# Patient Record
Sex: MALE | Race: WHITE | Employment: STUDENT | URBAN - METROPOLITAN AREA
[De-identification: names, ages, dates, MRNs, and addresses within clinical notes are randomized per-mention and may not be internally consistent; named-entity substitution may affect disease eponyms.]

---
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
## 2024-12-27 ENCOUNTER — HOSPITAL ENCOUNTER (EMERGENCY)
Facility: HOSPITAL | Age: 24
Discharge: HOME | End: 2024-12-28
Attending: EMERGENCY MEDICINE
Payer: COMMERCIAL

## 2024-12-27 DIAGNOSIS — R11.2 NAUSEA AND VOMITING, UNSPECIFIED VOMITING TYPE: Primary | ICD-10-CM

## 2024-12-27 LAB
ALBUMIN SERPL-MCNC: 4.7 G/DL (ref 3.5–5.2)
ALBUMIN/GLOB SERPL: 1.8 G/DL
ALP SERPL-CCNC: 52 U/L (ref 39–117)
ALT SERPL W P-5'-P-CCNC: 40 U/L (ref 1–41)
ANION GAP SERPL CALCULATED.3IONS-SCNC: 14.7 MMOL/L (ref 5–15)
AST SERPL-CCNC: 32 U/L (ref 1–40)
BASOPHILS # BLD AUTO: 0.01 10*3/MM3 (ref 0–0.2)
BASOPHILS NFR BLD AUTO: 0.1 % (ref 0–1.5)
BILIRUB SERPL-MCNC: 2.5 MG/DL (ref 0–1.2)
BUN SERPL-MCNC: 18 MG/DL (ref 6–20)
BUN/CREAT SERPL: 15.5 (ref 7–25)
CALCIUM SPEC-SCNC: 9.4 MG/DL (ref 8.6–10.5)
CHLORIDE SERPL-SCNC: 98 MMOL/L (ref 98–107)
CO2 SERPL-SCNC: 27.3 MMOL/L (ref 22–29)
CREAT SERPL-MCNC: 1.16 MG/DL (ref 0.76–1.27)
DEPRECATED RDW RBC AUTO: 34.8 FL (ref 37–54)
EGFRCR SERPLBLD CKD-EPI 2021: 90.8 ML/MIN/1.73
EOSINOPHIL # BLD AUTO: 0.09 10*3/MM3 (ref 0–0.4)
EOSINOPHIL NFR BLD AUTO: 0.8 % (ref 0.3–6.2)
ERYTHROCYTE [DISTWIDTH] IN BLOOD BY AUTOMATED COUNT: 11.9 % (ref 12.3–15.4)
GLOBULIN UR ELPH-MCNC: 2.6 GM/DL
GLUCOSE SERPL-MCNC: 101 MG/DL (ref 65–99)
HCT VFR BLD AUTO: 45.5 % (ref 37.5–51)
HGB BLD-MCNC: 15.8 G/DL (ref 13–17.7)
HOLD SPECIMEN: NORMAL
IMM GRANULOCYTES # BLD AUTO: 0.01 10*3/MM3 (ref 0–0.05)
IMM GRANULOCYTES NFR BLD AUTO: 0.1 % (ref 0–0.5)
LIPASE SERPL-CCNC: 18 U/L (ref 13–60)
LYMPHOCYTES # BLD AUTO: 0.57 10*3/MM3 (ref 0.7–3.1)
LYMPHOCYTES NFR BLD AUTO: 5.4 % (ref 19.6–45.3)
MCH RBC QN AUTO: 27.7 PG (ref 26.6–33)
MCHC RBC AUTO-ENTMCNC: 34.7 G/DL (ref 31.5–35.7)
MCV RBC AUTO: 79.7 FL (ref 79–97)
MONOCYTES # BLD AUTO: 0.48 10*3/MM3 (ref 0.1–0.9)
MONOCYTES NFR BLD AUTO: 4.5 % (ref 5–12)
NEUTROPHILS NFR BLD AUTO: 89.1 % (ref 42.7–76)
NEUTROPHILS NFR BLD AUTO: 9.43 10*3/MM3 (ref 1.7–7)
PLATELET # BLD AUTO: 220 10*3/MM3 (ref 140–450)
PMV BLD AUTO: 10.7 FL (ref 6–12)
POTASSIUM SERPL-SCNC: 3.7 MMOL/L (ref 3.5–5.2)
PROT SERPL-MCNC: 7.3 G/DL (ref 6–8.5)
RBC # BLD AUTO: 5.71 10*6/MM3 (ref 4.14–5.8)
SODIUM SERPL-SCNC: 140 MMOL/L (ref 136–145)
WBC NRBC COR # BLD AUTO: 10.59 10*3/MM3 (ref 3.4–10.8)
WHOLE BLOOD HOLD COAG: NORMAL
WHOLE BLOOD HOLD SPECIMEN: NORMAL

## 2024-12-27 PROCEDURE — 25010000002 ONDANSETRON PER 1 MG: Performed by: EMERGENCY MEDICINE

## 2024-12-27 PROCEDURE — 96374 THER/PROPH/DIAG INJ IV PUSH: CPT

## 2024-12-27 PROCEDURE — 25810000003 SODIUM CHLORIDE 0.9 % SOLUTION: Performed by: EMERGENCY MEDICINE

## 2024-12-27 PROCEDURE — 85025 COMPLETE CBC W/AUTO DIFF WBC: CPT | Performed by: EMERGENCY MEDICINE

## 2024-12-27 PROCEDURE — 80053 COMPREHEN METABOLIC PANEL: CPT | Performed by: EMERGENCY MEDICINE

## 2024-12-27 PROCEDURE — 99283 EMERGENCY DEPT VISIT LOW MDM: CPT

## 2024-12-27 PROCEDURE — 83690 ASSAY OF LIPASE: CPT | Performed by: EMERGENCY MEDICINE

## 2024-12-27 RX ORDER — SODIUM CHLORIDE 0.9 % (FLUSH) 0.9 %
10 SYRINGE (ML) INJECTION AS NEEDED
Status: DISCONTINUED | OUTPATIENT
Start: 2024-12-27 | End: 2024-12-28 | Stop reason: HOSPADM

## 2024-12-27 RX ORDER — ONDANSETRON 4 MG/1
4 TABLET, ORALLY DISINTEGRATING ORAL EVERY 6 HOURS PRN
Qty: 8 TABLET | Refills: 0 | OUTPATIENT
Start: 2024-12-27 | End: 2024-12-28

## 2024-12-27 RX ORDER — ONDANSETRON 2 MG/ML
4 INJECTION INTRAMUSCULAR; INTRAVENOUS ONCE
Status: COMPLETED | OUTPATIENT
Start: 2024-12-27 | End: 2024-12-27

## 2024-12-27 RX ADMIN — ONDANSETRON 4 MG: 2 INJECTION INTRAMUSCULAR; INTRAVENOUS at 23:25

## 2024-12-27 RX ADMIN — SODIUM CHLORIDE 500 ML: 9 INJECTION, SOLUTION INTRAVENOUS at 23:26

## 2024-12-27 NOTE — Clinical Note
[facility] EMERGENCY DEPARTMENT [place]  [address]   [place] [address]  Phone: [phone]  Fax: [phone]    Stevie Stevenson was seen and treated in our emergency department on 12/27/2024.  He may return to work on 12/29/2024.         Thank you for choosing [facility].    Darrick Arciniega MD

## 2024-12-27 NOTE — Clinical Note
[facility] EMERGENCY DEPARTMENT [place]  [address]   [place] [address]  Phone: [phone]  Fax: [phone]    Stevie Stevenson was seen and treated in our emergency department on 12/27/2024.  He may return to work on 12/29/2024.         Thank you for choosing [facility].    Darrick Arciniega MD

## 2024-12-28 VITALS
BODY MASS INDEX: 27.09 KG/M2 | OXYGEN SATURATION: 95 % | HEART RATE: 96 BPM | SYSTOLIC BLOOD PRESSURE: 123 MMHG | HEIGHT: 72 IN | DIASTOLIC BLOOD PRESSURE: 67 MMHG | RESPIRATION RATE: 19 BRPM | WEIGHT: 200 LBS | TEMPERATURE: 98.3 F

## 2024-12-28 RX ORDER — ONDANSETRON 4 MG/1
4 TABLET, ORALLY DISINTEGRATING ORAL EVERY 6 HOURS PRN
Qty: 8 TABLET | Refills: 0 | OUTPATIENT
Start: 2024-12-28 | End: 2024-12-28

## 2024-12-28 RX ORDER — ONDANSETRON 4 MG/1
4 TABLET, ORALLY DISINTEGRATING ORAL EVERY 6 HOURS PRN
Qty: 8 TABLET | Refills: 0 | Status: SHIPPED | OUTPATIENT
Start: 2024-12-28

## 2024-12-28 NOTE — FSED PROVIDER NOTE
"Subjective  History of Present Illness:    The patient presents with vomiting.  Pt reports that this started 30 minutes after eating chicken tenders.  Pt denies diarrhea.  Pt reports diffuse abdominal pain that is alleviated after vomiting.  Pt denies fever.  Pt denies changes in uriination      Nurses Notes reviewed and agree, including vitals, allergies, social history and prior medical history.     REVIEW OF SYSTEMS:   Review of Systems   Gastrointestinal:  Positive for abdominal pain.       History reviewed. No pertinent past medical history.    Allergies:    Gluten meal and Keflex [cephalexin]      History reviewed. No pertinent surgical history.      Social History     Socioeconomic History    Marital status: Single         History reviewed. No pertinent family history.    Objective  Physical Exam:  /67   Pulse 96   Temp 98.3 °F (36.8 °C) (Oral)   Resp 19   Ht 182.9 cm (72\")   Wt 90.7 kg (200 lb)   SpO2 95%   BMI 27.12 kg/m²      Physical Exam  Vitals and nursing note reviewed.   Constitutional:       Appearance: He is well-developed. He is obese.   Eyes:      Extraocular Movements: Extraocular movements intact.      Pupils: Pupils are equal, round, and reactive to light.   Cardiovascular:      Rate and Rhythm: Normal rate and regular rhythm.      Heart sounds: Normal heart sounds.      No friction rub.   Pulmonary:      Effort: Pulmonary effort is normal. No respiratory distress.      Breath sounds: Normal breath sounds.   Abdominal:      Palpations: Abdomen is soft.   Skin:     General: Skin is warm and dry.   Neurological:      General: No focal deficit present.      Mental Status: He is alert.      Cranial Nerves: No cranial nerve deficit.   Psychiatric:         Mood and Affect: Mood normal.         Behavior: Behavior normal.         Procedures    ED Course:         Lab Results (last 24 hours)       Procedure Component Value Units Date/Time    CBC & Differential [[id number]]  (Abnormal) " Collected: 12/27/24 2326    Specimen: Blood Updated: 12/27/24 2335    Narrative:      The following orders were created for panel order CBC & Differential.  Procedure                               Abnormality         Status                     ---------                               -----------         ------                     CBC Auto Differential[[id number]]        Abnormal            Final result                 Please view results for these tests on the individual orders.    Comprehensive Metabolic Panel [[id number]]  (Abnormal) Collected: 12/27/24 2326    Specimen: Blood Updated: 12/27/24 2352     Glucose 101 mg/dL      BUN 18 mg/dL      Creatinine 1.16 mg/dL      Sodium 140 mmol/L      Potassium 3.7 mmol/L      Chloride 98 mmol/L      CO2 27.3 mmol/L      Calcium 9.4 mg/dL      Total Protein 7.3 g/dL      Albumin 4.7 g/dL      ALT (SGPT) 40 U/L      AST (SGOT) 32 U/L      Alkaline Phosphatase 52 U/L      Total Bilirubin 2.5 mg/dL      Globulin 2.6 gm/dL      A/G Ratio 1.8 g/dL      BUN/Creatinine Ratio 15.5     Anion Gap 14.7 mmol/L      eGFR 90.8 mL/min/1.73     Narrative:      GFR Categories in Chronic Kidney Disease (CKD)      GFR Category          GFR (mL/min/1.73)    Interpretation  G1                     90 or greater         Normal or high (1)  G2                      60-89                Mild decrease (1)  G3a                   45-59                Mild to moderate decrease  G3b                   30-44                Moderate to severe decrease  G4                    15-29                Severe decrease  G5                    14 or less           Kidney failure          (1)In the absence of evidence of kidney disease, neither GFR category G1 or G2 fulfill the criteria for CKD.    eGFR calculation 2021 CKD-EPI creatinine equation, which does not include race as a factor    Lipase [[id number]]  (Normal) Collected: 12/27/24 2326    Specimen: Blood Updated: 12/27/24 2352     Lipase 18 U/L     CBC Auto  Differential [[id number]]  (Abnormal) Collected: 12/27/24 2326    Specimen: Blood Updated: 12/27/24 2335     WBC 10.59 10*3/mm3      RBC 5.71 10*6/mm3      Hemoglobin 15.8 g/dL      Hematocrit 45.5 %      MCV 79.7 fL      MCH 27.7 pg      MCHC 34.7 g/dL      RDW 11.9 %      RDW-SD 34.8 fl      MPV 10.7 fL      Platelets 220 10*3/mm3      Neutrophil % 89.1 %      Lymphocyte % 5.4 %      Monocyte % 4.5 %      Eosinophil % 0.8 %      Basophil % 0.1 %      Immature Grans % 0.1 %      Neutrophils, Absolute 9.43 10*3/mm3      Lymphocytes, Absolute 0.57 10*3/mm3      Monocytes, Absolute 0.48 10*3/mm3      Eosinophils, Absolute 0.09 10*3/mm3      Basophils, Absolute 0.01 10*3/mm3      Immature Grans, Absolute 0.01 10*3/mm3              No radiology results from the last 24 hrs       MDM      Initial impression of presenting illness: Food poisoning    DDX: includes but is not limited to: Viral gastroenteritis, gastritis, pancreatitis    Patient arrives by private with vitals interpreted by myself.     Pertinent features from physical exam: No significant abdominal tenderness.    Initial diagnostic plan: Labs    Results from initial plan were reviewed and interpreted by me revealing nonactionable      Interventions / Re-evaluation: Patient reports pain is resolved after medication was given    Medications   ondansetron (ZOFRAN) injection 4 mg (4 mg Intravenous Given 12/27/24 2325)   sodium chloride 0.9 % bolus 500 mL (0 mL Intravenous Stopped 12/28/24 0037)       Results/clinical rationale were discussed with patient, patient's mother, and patient's father    Data interpreted: Nursing notes reviewed, vital signs reviewed.  CBC with differential, CMP, and lipase  No data reviewed reviewed independently interpreted by me.  EKG independently interpreted by me.  O2 saturation:    Care significantly affected by Social Determinants of Health (housing and economic circumstances, unemployment)               [] Yes     [x] No               If yes, Patient's care significantly limited by  Social Determinants of Health including:              [] Inadequate housing              [] Low income              [] Alcoholism and drug addiction in family              [] Problems related to primary support group              [] Unemployment              [] Problems related to employment              [] Other Social Determinants of Health:     Counseling: Discussed the results above with the patient regarding need for discharged home. Return precautions were given to patient, patient's mother, and patient's father.  Patient understands and agrees plan of care.      -----  ED Disposition       ED Disposition   Discharge    Condition   Stable    Comment   --             Final diagnoses:   Nausea and vomiting, unspecified vomiting type      Your Follow-Up Providers       Go to  [facility] EMERGENCY DEPARTMENT [place].    Specialty: Emergency Medicine  Follow up details: If symptoms worsen  [address]  [place] [facility] [address] [phone]             PATIENT CONNECTION - Mayer. Call in 1 week.    [facility] [address] [phone]                     Contact information for after-discharge care    Follow-up information has not been specified.                    Your medication list        START taking these medications        Instructions Last Dose Given Next Dose Due   ondansetron ODT 4 MG disintegrating tablet  Commonly known as: ZOFRAN-ODT      Take 1 tablet by mouth Every 6 (Six) Hours As Needed for Nausea or Vomiting for up to 8 doses.                 Where to Get Your Medications        You can get these medications from any pharmacy    Bring a paper prescription for each of these medications  ondansetron ODT 4 MG disintegrating tablet

## 2025-05-16 NOTE — PROGRESS NOTES
[facility] PRE-SURGICAL TESTING INSTRUCTIONS                      PRIOR TO PROCEDURE DATE:    1. PLEASE FOLLOW ANY INSTRUCTIONS GIVEN TO YOU PER YOUR SURGEON.      2. Arrange for someone to drive you home and be with you for the first 24 hours after discharge for your safety after your procedure for which you received sedation. Ensure it is someone we can share information with regarding your discharge.     NOTE: At this time ONLY 2 ADULTS may accompany you   One person ENCOURAGED to stay at hospital entire time if outpatient surgery      3. You must contact your surgeon for instructions IF:  You are taking any blood thinners, aspirin, anti-inflammatory or vitamins.  There is a change in your physical condition such as a cold, fever, rash, cuts, sores, or any other infection, especially near your surgical site.    4. Do not drink alcohol the day before or day of your procedure.  Do not use any recreational marijuana at least 24 hours or street drugs (heroin, cocaine) at minimum 5 days prior to your procedure.     5. A Pre-Surgical History and Physical MUST be completed WITHIN 30 DAYS OR LESS prior to your procedure.by your Physician or an Urgent Care        THE DAY OF YOUR PROCEDURE:  1.  Follow instructions for ARRIVAL TIME as DIRECTED BY YOUR SURGEON.     2. Enter the MAIN entrance from [facility] and follow the signs to the free Parking Garage or  Parking (offered free of charge 7 am-5pm).      3. Enter the Main Entrance of the hospital (do not enter from the lower level of the parking garage). Upon entrance, check in with the  at the surgical information desk on your LEFT.   Bring your insurance card and photo ID to register      4. DO NOT EAT ANYTHING 8 hours prior to arrival for surgery.  You may have up to 8 ounces of water 4 hours prior to your arrival for surgery.   NOTE: ALL Gastric, Bariatric & Bowel surgery patients - you MUST follow your surgeon's instructions regarding

## 2025-05-16 NOTE — PROGRESS NOTES
5/16 @ 8921 Pt confirms did not have nasal swab or any bloodwork with PreOp. TI Complete. MD    5/16 @ 1555 Left VM with Sallie @ Dr. Rodriguez office to clarify if OK to have BMP, CBC & MRSA swab DOS. MD

## 2025-05-21 ENCOUNTER — HOSPITAL ENCOUNTER (OUTPATIENT)
Age: 25
Setting detail: OUTPATIENT SURGERY
Discharge: HOME | End: 2025-05-21
Attending: ORTHOPAEDIC SURGERY | Admitting: ORTHOPAEDIC SURGERY
Payer: COMMERCIAL

## 2025-05-21 ENCOUNTER — ANESTHESIA (OUTPATIENT)
Dept: OPERATING ROOM | Age: 25
End: 2025-05-21
Payer: COMMERCIAL

## 2025-05-21 ENCOUNTER — ANESTHESIA EVENT (OUTPATIENT)
Dept: OPERATING ROOM | Age: 25
End: 2025-05-21
Payer: COMMERCIAL

## 2025-05-21 VITALS
OXYGEN SATURATION: 99 % | RESPIRATION RATE: 16 BRPM | BODY MASS INDEX: 26.38 KG/M2 | TEMPERATURE: 97.2 F | HEART RATE: 79 BPM | SYSTOLIC BLOOD PRESSURE: 102 MMHG | WEIGHT: 194.8 LBS | HEIGHT: 72 IN | DIASTOLIC BLOOD PRESSURE: 58 MMHG

## 2025-05-21 DIAGNOSIS — M67.02 ACQUIRED CONTRACTURE OF ACHILLES TENDON, LEFT: ICD-10-CM

## 2025-05-21 DIAGNOSIS — T84.293A: ICD-10-CM

## 2025-05-21 DIAGNOSIS — M19.172 POST-TRAUMATIC OSTEOARTHRITIS, LEFT ANKLE AND FOOT: ICD-10-CM

## 2025-05-21 LAB
ANION GAP SERPL CALCULATED.3IONS-SCNC: 11 MMOL/L (ref 3–16)
BASOPHILS # BLD: 0 K/UL (ref 0–0.2)
BASOPHILS NFR BLD: 0.6 %
BUN SERPL-MCNC: 15 MG/DL (ref 7–20)
CALCIUM SERPL-MCNC: 9.2 MG/DL (ref 8.3–10.6)
CHLORIDE SERPL-SCNC: 103 MMOL/L (ref 99–110)
CO2 SERPL-SCNC: 25 MMOL/L (ref 21–32)
CREAT SERPL-MCNC: 1 MG/DL (ref 0.9–1.3)
DEPRECATED RDW RBC AUTO: 13.1 % (ref 12.4–15.4)
EOSINOPHIL # BLD: 0.2 K/UL (ref 0–0.6)
EOSINOPHIL NFR BLD: 2.6 %
GFR SERPLBLD CREATININE-BSD FMLA CKD-EPI: >90 ML/MIN/{1.73_M2}
GLUCOSE SERPL-MCNC: 100 MG/DL (ref 70–99)
HCT VFR BLD AUTO: 42 % (ref 40.5–52.5)
HGB BLD-MCNC: 14.8 G/DL (ref 13.5–17.5)
LYMPHOCYTES # BLD: 2.8 K/UL (ref 1–5.1)
LYMPHOCYTES NFR BLD: 36.9 %
MCH RBC QN AUTO: 27.9 PG (ref 26–34)
MCHC RBC AUTO-ENTMCNC: 35.2 G/DL (ref 31–36)
MCV RBC AUTO: 79.2 FL (ref 80–100)
MONOCYTES # BLD: 0.7 K/UL (ref 0–1.3)
MONOCYTES NFR BLD: 9.2 %
NEUTROPHILS # BLD: 3.8 K/UL (ref 1.7–7.7)
NEUTROPHILS NFR BLD: 50.7 %
PLATELET # BLD AUTO: 240 K/UL (ref 135–450)
PMV BLD AUTO: 8.6 FL (ref 5–10.5)
POTASSIUM SERPL-SCNC: 3.5 MMOL/L (ref 3.5–5.1)
RBC # BLD AUTO: 5.3 M/UL (ref 4.2–5.9)
SODIUM SERPL-SCNC: 139 MMOL/L (ref 136–145)
WBC # BLD AUTO: 7.5 K/UL (ref 4–11)

## 2025-05-21 PROCEDURE — 2500000003 HC RX 250 WO HCPCS

## 2025-05-21 PROCEDURE — 7100000010 HC PHASE II RECOVERY - FIRST 15 MIN: Performed by: ORTHOPAEDIC SURGERY

## 2025-05-21 PROCEDURE — 6360000002 HC RX W HCPCS: Performed by: ANESTHESIOLOGY

## 2025-05-21 PROCEDURE — 6370000000 HC RX 637 (ALT 250 FOR IP)

## 2025-05-21 PROCEDURE — 80048 BASIC METABOLIC PNL TOTAL CA: CPT

## 2025-05-21 PROCEDURE — 6360000002 HC RX W HCPCS

## 2025-05-21 PROCEDURE — 2580000003 HC RX 258: Performed by: ANESTHESIOLOGY

## 2025-05-21 PROCEDURE — 3600000004 HC SURGERY LEVEL 4 BASE: Performed by: ORTHOPAEDIC SURGERY

## 2025-05-21 PROCEDURE — 7100000001 HC PACU RECOVERY - ADDTL 15 MIN: Performed by: ORTHOPAEDIC SURGERY

## 2025-05-21 PROCEDURE — 2720000010 HC SURG SUPPLY STERILE: Performed by: ORTHOPAEDIC SURGERY

## 2025-05-21 PROCEDURE — 64445 NJX AA&/STRD SCIATIC NRV IMG: CPT | Performed by: ANESTHESIOLOGY

## 2025-05-21 PROCEDURE — 3700000000 HC ANESTHESIA ATTENDED CARE: Performed by: ORTHOPAEDIC SURGERY

## 2025-05-21 PROCEDURE — 87641 MR-STAPH DNA AMP PROBE: CPT

## 2025-05-21 PROCEDURE — 7100000011 HC PHASE II RECOVERY - ADDTL 15 MIN: Performed by: ORTHOPAEDIC SURGERY

## 2025-05-21 PROCEDURE — 7100000000 HC PACU RECOVERY - FIRST 15 MIN: Performed by: ORTHOPAEDIC SURGERY

## 2025-05-21 PROCEDURE — C1713 ANCHOR/SCREW BN/BN,TIS/BN: HCPCS | Performed by: ORTHOPAEDIC SURGERY

## 2025-05-21 PROCEDURE — 64447 NJX AA&/STRD FEMORAL NRV IMG: CPT | Performed by: ANESTHESIOLOGY

## 2025-05-21 PROCEDURE — 6360000002 HC RX W HCPCS: Performed by: ORTHOPAEDIC SURGERY

## 2025-05-21 PROCEDURE — 88305 TISSUE EXAM BY PATHOLOGIST: CPT

## 2025-05-21 PROCEDURE — 2709999900 HC NON-CHARGEABLE SUPPLY: Performed by: ORTHOPAEDIC SURGERY

## 2025-05-21 PROCEDURE — C1769 GUIDE WIRE: HCPCS | Performed by: ORTHOPAEDIC SURGERY

## 2025-05-21 PROCEDURE — 3700000001 HC ADD 15 MINUTES (ANESTHESIA): Performed by: ORTHOPAEDIC SURGERY

## 2025-05-21 PROCEDURE — 3600000014 HC SURGERY LEVEL 4 ADDTL 15MIN: Performed by: ORTHOPAEDIC SURGERY

## 2025-05-21 PROCEDURE — 6370000000 HC RX 637 (ALT 250 FOR IP): Performed by: ORTHOPAEDIC SURGERY

## 2025-05-21 PROCEDURE — 2580000003 HC RX 258

## 2025-05-21 PROCEDURE — 88331 PATH CONSLTJ SURG 1 BLK 1SPC: CPT

## 2025-05-21 PROCEDURE — 2500000003 HC RX 250 WO HCPCS: Performed by: ORTHOPAEDIC SURGERY

## 2025-05-21 PROCEDURE — 85025 COMPLETE CBC W/AUTO DIFF WBC: CPT

## 2025-05-21 DEVICE — IMPLANTABLE DEVICE: Type: IMPLANTABLE DEVICE | Site: ANKLE | Status: FUNCTIONAL

## 2025-05-21 DEVICE — FIXATION BEAM, 6.5 X 85 MM
Type: IMPLANTABLE DEVICE | Status: FUNCTIONAL
Brand: AXIS

## 2025-05-21 DEVICE — BONE GRAFT KIT 7510050 INFUSE XX SMALL
Type: IMPLANTABLE DEVICE | Status: FUNCTIONAL
Brand: INFUSE® BONE GRAFT

## 2025-05-21 DEVICE — FIXATION BEAM, 6.5 X 90 MM
Type: IMPLANTABLE DEVICE | Status: FUNCTIONAL
Brand: AXIS

## 2025-05-21 RX ORDER — SODIUM CHLORIDE 0.9 % (FLUSH) 0.9 %
5-40 SYRINGE (ML) INJECTION EVERY 12 HOURS SCHEDULED
Status: DISCONTINUED | OUTPATIENT
Start: 2025-05-21 | End: 2025-05-21 | Stop reason: HOSPADM

## 2025-05-21 RX ORDER — LIDOCAINE HYDROCHLORIDE 20 MG/ML
INJECTION, SOLUTION INTRAVENOUS
Status: DISCONTINUED | OUTPATIENT
Start: 2025-05-21 | End: 2025-05-21 | Stop reason: SDUPTHER

## 2025-05-21 RX ORDER — SODIUM CHLORIDE 0.9 % (FLUSH) 0.9 %
5-40 SYRINGE (ML) INJECTION PRN
Status: DISCONTINUED | OUTPATIENT
Start: 2025-05-21 | End: 2025-05-21 | Stop reason: HOSPADM

## 2025-05-21 RX ORDER — BUPIVACAINE HYDROCHLORIDE 5 MG/ML
INJECTION, SOLUTION EPIDURAL; INTRACAUDAL; PERINEURAL
Status: COMPLETED
Start: 2025-05-21 | End: 2025-05-21

## 2025-05-21 RX ORDER — PROCHLORPERAZINE EDISYLATE 5 MG/ML
5 INJECTION INTRAMUSCULAR; INTRAVENOUS
Status: DISCONTINUED | OUTPATIENT
Start: 2025-05-21 | End: 2025-05-21 | Stop reason: HOSPADM

## 2025-05-21 RX ORDER — CLINDAMYCIN PHOSPHATE 900 MG/50ML
900 INJECTION, SOLUTION INTRAVENOUS ONCE
Status: COMPLETED | OUTPATIENT
Start: 2025-05-21 | End: 2025-05-21

## 2025-05-21 RX ORDER — SODIUM CHLORIDE 9 MG/ML
INJECTION, SOLUTION INTRAVENOUS CONTINUOUS
Status: DISCONTINUED | OUTPATIENT
Start: 2025-05-21 | End: 2025-05-21 | Stop reason: HOSPADM

## 2025-05-21 RX ORDER — GABAPENTIN 300 MG/1
300 CAPSULE ORAL ONCE
Status: COMPLETED | OUTPATIENT
Start: 2025-05-21 | End: 2025-05-21

## 2025-05-21 RX ORDER — KETOROLAC TROMETHAMINE 30 MG/ML
INJECTION, SOLUTION INTRAMUSCULAR; INTRAVENOUS
Status: DISCONTINUED | OUTPATIENT
Start: 2025-05-21 | End: 2025-05-21 | Stop reason: SDUPTHER

## 2025-05-21 RX ORDER — HYDROMORPHONE HYDROCHLORIDE 1 MG/ML
0.5 INJECTION, SOLUTION INTRAMUSCULAR; INTRAVENOUS; SUBCUTANEOUS EVERY 5 MIN PRN
Status: DISCONTINUED | OUTPATIENT
Start: 2025-05-21 | End: 2025-05-21 | Stop reason: HOSPADM

## 2025-05-21 RX ORDER — GLYCOPYRROLATE 0.2 MG/ML
INJECTION INTRAMUSCULAR; INTRAVENOUS
Status: DISCONTINUED | OUTPATIENT
Start: 2025-05-21 | End: 2025-05-21 | Stop reason: SDUPTHER

## 2025-05-21 RX ORDER — LIDOCAINE HYDROCHLORIDE 40 MG/ML
SOLUTION TOPICAL
Status: DISCONTINUED | OUTPATIENT
Start: 2025-05-21 | End: 2025-05-21 | Stop reason: SDUPTHER

## 2025-05-21 RX ORDER — NALOXONE HYDROCHLORIDE 0.4 MG/ML
INJECTION, SOLUTION INTRAMUSCULAR; INTRAVENOUS; SUBCUTANEOUS PRN
Status: DISCONTINUED | OUTPATIENT
Start: 2025-05-21 | End: 2025-05-21 | Stop reason: HOSPADM

## 2025-05-21 RX ORDER — PHENYLEPHRINE HCL IN 0.9% NACL 1 MG/10 ML
SYRINGE (ML) INTRAVENOUS
Status: DISCONTINUED | OUTPATIENT
Start: 2025-05-21 | End: 2025-05-21 | Stop reason: SDUPTHER

## 2025-05-21 RX ORDER — PROPOFOL 10 MG/ML
INJECTION, EMULSION INTRAVENOUS
Status: DISCONTINUED | OUTPATIENT
Start: 2025-05-21 | End: 2025-05-21 | Stop reason: SDUPTHER

## 2025-05-21 RX ORDER — DEXAMETHASONE SODIUM PHOSPHATE 4 MG/ML
INJECTION, SOLUTION INTRA-ARTICULAR; INTRALESIONAL; INTRAMUSCULAR; INTRAVENOUS; SOFT TISSUE
Status: DISCONTINUED | OUTPATIENT
Start: 2025-05-21 | End: 2025-05-21 | Stop reason: SDUPTHER

## 2025-05-21 RX ORDER — LABETALOL HYDROCHLORIDE 5 MG/ML
10 INJECTION, SOLUTION INTRAVENOUS
Status: DISCONTINUED | OUTPATIENT
Start: 2025-05-21 | End: 2025-05-21 | Stop reason: HOSPADM

## 2025-05-21 RX ORDER — MIDAZOLAM HYDROCHLORIDE 1 MG/ML
INJECTION, SOLUTION INTRAMUSCULAR; INTRAVENOUS
Status: COMPLETED
Start: 2025-05-21 | End: 2025-05-21

## 2025-05-21 RX ORDER — BUPIVACAINE HYDROCHLORIDE 5 MG/ML
INJECTION, SOLUTION EPIDURAL; INTRACAUDAL; PERINEURAL
Status: COMPLETED | OUTPATIENT
Start: 2025-05-21 | End: 2025-05-21

## 2025-05-21 RX ORDER — SODIUM CHLORIDE 9 MG/ML
INJECTION, SOLUTION INTRAVENOUS PRN
Status: DISCONTINUED | OUTPATIENT
Start: 2025-05-21 | End: 2025-05-21 | Stop reason: HOSPADM

## 2025-05-21 RX ORDER — ONDANSETRON 2 MG/ML
INJECTION INTRAMUSCULAR; INTRAVENOUS
Status: DISCONTINUED | OUTPATIENT
Start: 2025-05-21 | End: 2025-05-21 | Stop reason: SDUPTHER

## 2025-05-21 RX ORDER — ROCURONIUM BROMIDE 10 MG/ML
INJECTION, SOLUTION INTRAVENOUS
Status: DISCONTINUED | OUTPATIENT
Start: 2025-05-21 | End: 2025-05-21 | Stop reason: SDUPTHER

## 2025-05-21 RX ORDER — LIDOCAINE HYDROCHLORIDE 10 MG/ML
1 INJECTION, SOLUTION EPIDURAL; INFILTRATION; INTRACAUDAL; PERINEURAL
Status: DISCONTINUED | OUTPATIENT
Start: 2025-05-21 | End: 2025-05-21 | Stop reason: HOSPADM

## 2025-05-21 RX ORDER — OXYCODONE HYDROCHLORIDE 5 MG/1
10 TABLET ORAL PRN
Status: DISCONTINUED | OUTPATIENT
Start: 2025-05-21 | End: 2025-05-21 | Stop reason: HOSPADM

## 2025-05-21 RX ORDER — SODIUM CHLORIDE, SODIUM LACTATE, POTASSIUM CHLORIDE, CALCIUM CHLORIDE 600; 310; 30; 20 MG/100ML; MG/100ML; MG/100ML; MG/100ML
INJECTION, SOLUTION INTRAVENOUS CONTINUOUS
Status: DISCONTINUED | OUTPATIENT
Start: 2025-05-21 | End: 2025-05-21 | Stop reason: HOSPADM

## 2025-05-21 RX ORDER — FENTANYL CITRATE 50 UG/ML
25 INJECTION, SOLUTION INTRAMUSCULAR; INTRAVENOUS EVERY 5 MIN PRN
Status: DISCONTINUED | OUTPATIENT
Start: 2025-05-21 | End: 2025-05-21 | Stop reason: HOSPADM

## 2025-05-21 RX ORDER — ASPIRIN 325 MG
325 TABLET ORAL
COMMUNITY
Start: 2025-05-21

## 2025-05-21 RX ORDER — MIDAZOLAM HYDROCHLORIDE 1 MG/ML
INJECTION, SOLUTION INTRAMUSCULAR; INTRAVENOUS
Status: COMPLETED | OUTPATIENT
Start: 2025-05-21 | End: 2025-05-21

## 2025-05-21 RX ORDER — OXYCODONE HYDROCHLORIDE 5 MG/1
5 TABLET ORAL PRN
Status: DISCONTINUED | OUTPATIENT
Start: 2025-05-21 | End: 2025-05-21 | Stop reason: HOSPADM

## 2025-05-21 RX ADMIN — Medication 100 MCG: at 08:35

## 2025-05-21 RX ADMIN — Medication 100 MCG: at 08:55

## 2025-05-21 RX ADMIN — CLINDAMYCIN PHOSPHATE 900 MG: 900 INJECTION, SOLUTION INTRAVENOUS at 07:40

## 2025-05-21 RX ADMIN — ROCURONIUM BROMIDE 10 MG: 10 INJECTION, SOLUTION INTRAVENOUS at 09:42

## 2025-05-21 RX ADMIN — Medication 100 MCG: at 08:43

## 2025-05-21 RX ADMIN — ROCURONIUM BROMIDE 10 MG: 10 INJECTION, SOLUTION INTRAVENOUS at 08:09

## 2025-05-21 RX ADMIN — ONDANSETRON 4 MG: 2 INJECTION INTRAMUSCULAR; INTRAVENOUS at 10:09

## 2025-05-21 RX ADMIN — SUGAMMADEX 50 MG: 100 INJECTION, SOLUTION INTRAVENOUS at 10:12

## 2025-05-21 RX ADMIN — Medication 100 MCG: at 08:56

## 2025-05-21 RX ADMIN — LIDOCAINE HYDROCHLORIDE 4 ML: 40 SOLUTION TOPICAL at 07:46

## 2025-05-21 RX ADMIN — BUPIVACAINE HYDROCHLORIDE 20 ML: 5 INJECTION, SOLUTION EPIDURAL; INTRACAUDAL; PERINEURAL at 06:59

## 2025-05-21 RX ADMIN — DEXAMETHASONE SODIUM PHOSPHATE 4 MG: 4 INJECTION INTRA-ARTICULAR; INTRALESIONAL; INTRAMUSCULAR; INTRAVENOUS; SOFT TISSUE at 07:48

## 2025-05-21 RX ADMIN — ROCURONIUM BROMIDE 10 MG: 10 INJECTION, SOLUTION INTRAVENOUS at 08:57

## 2025-05-21 RX ADMIN — PROPOFOL 100 MG: 10 INJECTION, EMULSION INTRAVENOUS at 07:42

## 2025-05-21 RX ADMIN — GLYCOPYRROLATE 0.1 MG: 0.2 INJECTION INTRAMUSCULAR; INTRAVENOUS at 10:25

## 2025-05-21 RX ADMIN — Medication 50 MCG: at 08:28

## 2025-05-21 RX ADMIN — ROCURONIUM BROMIDE 10 MG: 10 INJECTION, SOLUTION INTRAVENOUS at 09:55

## 2025-05-21 RX ADMIN — Medication 100 MCG: at 08:45

## 2025-05-21 RX ADMIN — Medication 100 MCG: at 10:17

## 2025-05-21 RX ADMIN — LIDOCAINE HYDROCHLORIDE 100 MG: 20 INJECTION, SOLUTION INTRAVENOUS at 07:42

## 2025-05-21 RX ADMIN — GABAPENTIN 300 MG: 300 CAPSULE ORAL at 06:46

## 2025-05-21 RX ADMIN — GLYCOPYRROLATE 0.1 MG: 0.2 INJECTION INTRAMUSCULAR; INTRAVENOUS at 10:22

## 2025-05-21 RX ADMIN — Medication 100 MCG: at 10:38

## 2025-05-21 RX ADMIN — DEXMEDETOMIDINE HYDROCHLORIDE 4 MCG: 100 INJECTION, SOLUTION INTRAVENOUS at 09:56

## 2025-05-21 RX ADMIN — Medication 50 MCG: at 08:23

## 2025-05-21 RX ADMIN — SUGAMMADEX 150 MG: 100 INJECTION, SOLUTION INTRAVENOUS at 10:19

## 2025-05-21 RX ADMIN — KETOROLAC TROMETHAMINE 30 MG: 30 INJECTION, SOLUTION INTRAMUSCULAR at 10:09

## 2025-05-21 RX ADMIN — PROPOFOL 50 MG: 10 INJECTION, EMULSION INTRAVENOUS at 07:44

## 2025-05-21 RX ADMIN — Medication 100 MCG: at 09:25

## 2025-05-21 RX ADMIN — Medication 100 MCG: at 10:25

## 2025-05-21 RX ADMIN — SODIUM CHLORIDE, SODIUM LACTATE, POTASSIUM CHLORIDE, AND CALCIUM CHLORIDE: .6; .31; .03; .02 INJECTION, SOLUTION INTRAVENOUS at 09:41

## 2025-05-21 RX ADMIN — SODIUM CHLORIDE, SODIUM LACTATE, POTASSIUM CHLORIDE, AND CALCIUM CHLORIDE: .6; .31; .03; .02 INJECTION, SOLUTION INTRAVENOUS at 06:46

## 2025-05-21 RX ADMIN — Medication 100 MCG: at 10:11

## 2025-05-21 RX ADMIN — DEXMEDETOMIDINE HYDROCHLORIDE 4 MCG: 100 INJECTION, SOLUTION INTRAVENOUS at 09:44

## 2025-05-21 RX ADMIN — Medication 50 MCG: at 08:06

## 2025-05-21 RX ADMIN — DEXMEDETOMIDINE HYDROCHLORIDE 4 MCG: 100 INJECTION, SOLUTION INTRAVENOUS at 10:48

## 2025-05-21 RX ADMIN — ROCURONIUM BROMIDE 60 MG: 10 INJECTION, SOLUTION INTRAVENOUS at 07:44

## 2025-05-21 RX ADMIN — Medication 50 MCG: at 08:03

## 2025-05-21 RX ADMIN — PROPOFOL 50 MG: 10 INJECTION, EMULSION INTRAVENOUS at 07:43

## 2025-05-21 RX ADMIN — MIDAZOLAM HYDROCHLORIDE 2 MG: 1 INJECTION, SOLUTION INTRAMUSCULAR; INTRAVENOUS at 06:59

## 2025-05-21 RX ADMIN — BUPIVACAINE HYDROCHLORIDE 10 ML: 5 INJECTION, SOLUTION EPIDURAL; INTRACAUDAL; PERINEURAL at 07:05

## 2025-05-21 ASSESSMENT — LIFESTYLE VARIABLES: SMOKING_STATUS: 0

## 2025-05-21 ASSESSMENT — PAIN - FUNCTIONAL ASSESSMENT
PAIN_FUNCTIONAL_ASSESSMENT: PREVENTS OR INTERFERES WITH MANY ACTIVE NOT PASSIVE ACTIVITIES
PAIN_FUNCTIONAL_ASSESSMENT: 0-10

## 2025-05-21 ASSESSMENT — PAIN SCALES - GENERAL
PAINLEVEL_OUTOF10: 0

## 2025-05-21 ASSESSMENT — PAIN DESCRIPTION - DESCRIPTORS: DESCRIPTORS: SHARP

## 2025-05-21 NOTE — ANESTHESIA PROCEDURE NOTES
Peripheral Block    Patient location during procedure: pre-op  Reason for block: post-op pain management and at surgeon's request  Start time: 5/21/2025 6:59 AM  End time: 5/21/2025 7:04 AM  Staffing  Performed: anesthesiologist   Anesthesiologist: Nishant Patterson DO  Performed by: Nishant Patterson DO  Authorized by: Nsihant Patterson DO    Preanesthetic Checklist  Completed: patient identified, IV checked, site marked, risks and benefits discussed, surgical/procedural consents, equipment checked, pre-op evaluation, timeout performed, anesthesia consent given, oxygen available, monitors applied/VS acknowledged, fire risk safety assessment completed and verbalized and blood product R/B/A discussed and consented  Peripheral Block   Patient position: right lateral decubitus  Prep: ChloraPrep  Provider prep: mask and sterile gloves  Patient monitoring: cardiac monitor, continuous pulse ox, continuous capnometry, frequent blood pressure checks, IV access, oxygen and responsive to questions  Block type: Sciatic  Popliteal  Laterality: left  Injection technique: single-shot  Guidance: ultrasound guided    Needle   Needle type: insulated echogenic nerve stimulator needle   Needle gauge: 21 G  Needle localization: ultrasound guidance  Needle length: 10 cm  Assessment   Injection assessment: negative aspiration for heme, no paresthesia on injection, local visualized surrounding nerve on ultrasound and no intravascular symptoms  Paresthesia pain: none  Slow fractionated injection: yes  Hemodynamics: stable  Outcomes: uncomplicated and patient tolerated procedure well    Medications Administered  midazolam (VERSED) injection 2 mg/2mL - IntraVENous   2 mg - 5/21/2025 6:59:00 AM  BUPivacaine (MARCAINE) PF injection 0.5% - Perineural   20 mL - 5/21/2025 6:59:00 AM

## 2025-05-21 NOTE — BRIEF OP NOTE
Brief Postoperative Note      Patient: Joshua Waters  YOB: 2000  MRN: [id number]    Date of Procedure: 5/21/2025    Pre-Op Diagnosis Codes:      * Post-traumatic osteoarthritis, left ankle and foot [M19.172]     * Other mechanical complication of internal fixation device of bone of toe, initial encounter [T84.293A]     * Acquired contracture of Achilles tendon, left [M67.02]    Post-Op Diagnosis: Same       Procedure(s):  LEFT: SUBTALAR JOINT ARTHRODESIS/ REMOVAL OF HARDWARE/ REPAIR FOR DISLOCATING PERONEAL TENDONS WITH FIBULAR OSTEOTOMY  .  .    Surgeon(s):  Sascha Rodriguez MD    Assistant:  Surgical Assistant: Gold Delatorre  First Assistant: Radha Atkinson PA    Anesthesia: General    Estimated Blood Loss (mL): less than 50     Complications: None    Specimens:   ID Type Source Tests Collected by Time Destination   A : Left Ankle Calcaneus bone Tissue Tissue SURGICAL PATHOLOGY Sascha Rodriguez MD 5/21/2025 0818        Implants:  Implant Name Type Inv. Item Serial No.  Lot No. LRB No. Used Action   GRAFT BNE SUBSTITUTE 5 CC VIABLE MTRX HETAL MTX - [id number]  GRAFT BNE SUBSTITUTE 5 CC VIABLE MTRX HETAL MTX  [employer] [id number] Left 1 Implanted         Drains: * No LDAs found *    Findings:  Infection Present At Time Of Surgery (PATOS) (choose all levels that have infection present):  No infection present  Other Findings: See Above.     Electronically signed by Sascha Rodriguez MD on 5/21/2025 at 10:11 AM

## 2025-05-21 NOTE — ANESTHESIA PROCEDURE NOTES
Peripheral Block    Patient location during procedure: pre-op  Reason for block: post-op pain management and at surgeon's request  Start time: 5/21/2025 7:05 AM  End time: 5/21/2025 7:08 AM  Staffing  Performed: anesthesiologist   Anesthesiologist: Nishant Patterson DO  Performed by: Nishant Patterson DO  Authorized by: Nishant Patterson DO    Preanesthetic Checklist  Completed: patient identified, IV checked, site marked, risks and benefits discussed, surgical/procedural consents, equipment checked, pre-op evaluation, timeout performed, anesthesia consent given, oxygen available, monitors applied/VS acknowledged, fire risk safety assessment completed and verbalized and blood product R/B/A discussed and consented  Peripheral Block   Patient position: supine  Prep: ChloraPrep  Provider prep: mask and sterile gloves  Patient monitoring: continuous pulse ox, cardiac monitor, continuous capnometry, IV access, oxygen and responsive to questions  Block type: Femoral  Adductor canal  Laterality: left  Injection technique: single-shot  Guidance: ultrasound guided  Local infiltration: bupivacaine  Local infiltration: bupivacaine    Needle   Needle type: insulated echogenic nerve stimulator needle   Needle gauge: 20 G  Needle localization: ultrasound guidance  Needle length: 10 cm  Assessment   Injection assessment: negative aspiration for heme, no paresthesia on injection, local visualized surrounding nerve on ultrasound and no intravascular symptoms  Hemodynamics: stable  Outcomes: uncomplicated and patient tolerated procedure well    Additional Notes  Femoral artery and vein identified with Ultrasound and the adductor canal plane was identified. Once identified, local anesthetic was injected into the plane with good spread.   Medications Administered  BUPivacaine (MARCAINE) PF injection 0.5% - Perineural   10 mL - 5/21/2025 7:05:00 AM

## 2025-05-21 NOTE — PROGRESS NOTES
Patient admitted to PACU #5 from OR per stretcher at 1102 s/p LEFT: SUBTALAR JOINT ARTHRODESIS/ REMOVAL OF HARDWARE/ REPAIR FOR DISLOCATING PERONEAL TENDONS WITH FIBULAR OSTEOTOMY - Left. Report received at bedside in PACU per CRNA and OR nurse. Patient was reported to be hypotensive during surgery which he received treatment for, see anesthesia record. No complications reported. Patient received a pre-op popliteal and adductor canal peripheral nerve blocks per Dr. Patterson. Patient connected to PACU monitoring equipment. IVF's infusing with site unremarkable. Patient arrived to PACU responsive but not fully wakeful from anesthesia with no difficulties noted and no pain verbalized. LLE surgical dressing with posterior splint and ace wrap in place but remains damp. No further changes. Will continue to monitor.

## 2025-05-21 NOTE — PROGRESS NOTES
PACU Transfer to [facility]    Vitals:    05/21/25 1215   BP: (!) 107/48   Pulse: 76   Resp: 14   Temp: 97.2 °F (36.2 °C)   SpO2: 100%         Intake/Output Summary (Last 24 hours) at 5/21/2025 1223  Last data filed at 5/21/2025 1059  Gross per 24 hour   Intake 1400 ml   Output 10 ml   Net 1390 ml       Pain assessment:  none  Pain Level: 0    Patient transferred to care of [facility] RN.  WAITING ON PACU TRANSPORTATION TO TAKE PATIENT TO SAME DAY. NO FURTHER CHANGES.   5/21/2025 12:23 PM

## 2025-05-21 NOTE — DISCHARGE INSTRUCTIONS
[facility], [facility]  [phone]    POSTOPERATIVE INSTRUCTIONS    - For the first 5 Days after your surgery, rest and elevate the surgical area as much as possible.     - Side effects to anesthesia may include nausea, lightheadedness, coughing, sore throat, and/or muscle aches. Rest, fluids, and light foods can help.    Please call our office if:      - Your hand or foot becomes numb, blue, or cold. It is normal to experience numbness 12 to 24 hrs after surgery if you did receive a nerve block.    - You have severe pain or burning which is not relieved with your pain medication.     - Your incision has become reddened or swollen, painful or has excessive bleeding or foul smelling drainage.    - Medications have been called to your pharmacy.  This will be the pharmacy you gave Dr. Rodriguez's team before surgery.    Take Aspirin 325mg morning and evening until instructed to discontinue this medication.      If your lower extremity was operated on:    Crutches/Roll-about/Wheelchair as needed.     You are non-weight bearing on your operative extremity.      You may loosen your Ace wrap: as necessary for pain control    Ok to begin gentle knee range of motion exercises evening of surgery.  Do your exercsies for DVT prevention as described to you by the therapist pre-operatively.    Keep your dressing completely dry.  Your dressings will be changed at your first post-op visit.  If your dressings were to get wet, please contact the office.                  PERIPHERAL NERVE BLOCK INSTRUCTIONS     Please remember while having a nerve block you are at an increased risk for BALANCE ISSUES AND FALLS  You were given a nerve block today from the anesthesiologist. Most nerve blocks last anywhere from 6-36 hours.  You should start taking your pain medication before the block wears off or when you first begin feeling discomfort. It takes at least 30-60 minutes for a pain pill to take effect. Pain medications should be taken

## 2025-05-21 NOTE — PROGRESS NOTES
-Armband and allergy applied  -Call light within reach  -Visitor at bedside  -CHG wipes used to cleanse surgical area  -VALERIA hose applied  -IV in right hand patent with LR infusing  -Preop medications administered per MAR  -All patient and visitor concerns addressed  -Visitor signed up for text message updates  -Pt states no further needs at this time    Anesthesia Dr. Patterson here to do block.  O2 placed.  Start time:0658  Stop time:0707  Tolerated well    See flow sheet for vital signs.

## 2025-05-21 NOTE — PROGRESS NOTES
Called and updated patient's wife, Jessica, at 1211 on patient's status as well as plan moving forward for discharge.

## 2025-05-21 NOTE — ANESTHESIA POSTPROCEDURE EVALUATION
Department of Anesthesiology  Postprocedure Note    Patient: Joshua Waters  MRN: [id number]  YOB: 2000  Date of evaluation: 5/21/2025    Procedure Summary       Date: 05/21/25 Room / Location: [place] [address] / [facility]    Anesthesia Start: 0732 Anesthesia Stop: 1107    Procedures:       LEFT: SUBTALAR JOINT ARTHRODESIS/ REMOVAL OF HARDWARE/ REPAIR FOR DISLOCATING PERONEAL TENDONS WITH FIBULAR OSTEOTOMY (Left: Foot)      . (Left: Foot)      . (Left: Foot) Diagnosis:       Post-traumatic osteoarthritis, left ankle and foot      Other mechanical complication of internal fixation device of bone of toe, initial encounter      Acquired contracture of Achilles tendon, left      (Post-traumatic osteoarthritis, left ankle and foot [M19.172])      (Other mechanical complication of internal fixation device of bone of toe, initial encounter [T84.293A])      (Acquired contracture of Achilles tendon, left [M67.02])    Surgeons: Sascha Rodriguez MD Responsible Provider: Nishant Patterson DO    Anesthesia Type: General, Regional ASA Status: 1            Anesthesia Type: General, Regional    Gray Phase I: Gray Score: 8    Gray Phase II:      Anesthesia Post Evaluation    Patient location during evaluation: PACU  Patient participation: complete - patient participated  Level of consciousness: awake and awake and alert  Pain score: 0  Airway patency: patent  Nausea & Vomiting: no nausea and no vomiting  Cardiovascular status: hemodynamically stable  Respiratory status: acceptable  Hydration status: euvolemic  Pain management: adequate and satisfactory to patient    No notable events documented.

## 2025-05-21 NOTE — PROGRESS NOTES
Ambulatory Surgery/Procedure Discharge Note    Vitals:    05/21/25 1300   BP: (!) 102/58   Pulse: 79   Resp: 16   Temp: 97.2 °F (36.2 °C)   SpO2: 99%       In: 1400 [I.V.:1400]  Out: 10     Restroom use offered before discharge.  Yes    Pain assessment:  none  Pain Level: 0    Pt a&o x4. BP within 20% of baseline BP. Drsg is C/D/I. Pt denies pain and nausea. Reviewed d/c instructions and removed IV.      Patient discharged to home/self care. Patient discharged via wheel chair by transporter to waiting family/S.O.       5/21/2025 1:33 PM

## 2025-05-21 NOTE — ANESTHESIA PRE PROCEDURE
Department of Anesthesiology  Preprocedure Note       Name:  Joshua Waters   Age:  24 y.o.  :  2000                                          MRN:  [id number]         Date:  2025      Surgeon: Surgeon(s):  Sascha Rodriguez MD    Procedure: Procedure(s):  LEFT: SUBTALAR JOINT ARTHRODESIS/ REMOVAL OF HARDWARE/ POSSIBLE GASTROCNEMIUS RECESSION  .  .    Medications prior to admission:   Prior to Admission medications    Not on File       Current medications:    Current Facility-Administered Medications   Medication Dose Route Frequency Provider Last Rate Last Admin   • clindamycin (CLEOCIN) 900 mg in dextrose 5 % 50 mL IVPB  900 mg IntraVENous Once Sascha Rodriguez MD       • gabapentin (NEURONTIN) capsule 300 mg  300 mg Oral Once Sascha Rodriguez MD       • 0.9 % sodium chloride infusion   IntraVENous Continuous Sascha Rodriguez MD       • lidocaine PF 1 % injection 1 mL  1 mL IntraDERmal Once PRN Cristian Hurtado MD       • lactated ringers infusion   IntraVENous Continuous Cristian Hurtado MD       • sodium chloride flush 0.9 % injection 5-40 mL  5-40 mL IntraVENous 2 times per day Cristian Hurtado MD       • sodium chloride flush 0.9 % injection 5-40 mL  5-40 mL IntraVENous PRN Cristian Hurtado MD           Allergies:    Allergies   Allergen Reactions   • Gluten    • Gluten Meal Itching   • Other/Food      Unknown name of antibiotics   • Amoxicillin-Pot Clavulanate Rash     Family HX breathing problems with sister     Family HX breathing problems with sister       Family HX breathing problems with sister   • Cephalexin Itching and Rash     Other Reaction(s): Unknown       Problem List:  There is no problem list on file for this patient.      Past Medical History:        Diagnosis Date   • ADHD    • Asthma     exercise induced   • Post-traumatic osteoarthritis of left ankle and foot        Past Surgical History:        Procedure Laterality Date   • ANTERIOR CRUCIATE

## 2025-05-21 NOTE — H&P
Date of Surgery Update:  Joshua Waters was seen, history and physical examination reviewed, and patient examined by me today. There have been no significant clinical changes since the completion of the previous history and physical.    The risks, benefits, and alternatives of the proposed procedure have been explained to the patient (or appropriate guardian) and understanding verbalized. All questions answered. Patient wishes to proceed.    Electronically signed by: Sascha Rodriguez MD,5/21/2025,7:31 AM

## 2025-05-22 LAB — MRSA DNA SPEC QL NAA+PROBE: NORMAL

## 2025-05-23 NOTE — OP NOTE
[address] [address]                            OPERATIVE REPORT      PATIENT NAME: SHOSHANA CHE                 : 2000  MED REC NO: [id number]                      ROOM: OR  ACCOUNT NO: [id number]                       ADMIT DATE: 2025  PROVIDER: Sascha Rodriguez MD      DATE OF PROCEDURE:  2025    SURGEON:  Sascha Rodriguez MD    SECOND SURGEON:  Radha Atkinson PA-C    PREOPERATIVE DIAGNOSES:  Left:  1. Subtalar joint arthritis.  2. Painful hardware.  3. Dislocated peroneal tendons.    POSTOPERATIVE DIAGNOSES:  Left:  1. Subtalar joint arthritis.  2. Painful hardware.  3. Dislocated peroneal tendons.    OPERATIONS:  Left:  1. Subtalar joint arthrodesis.  2. Removal of hardware.  3. Repair for dislocated peroneal tendons.    ANESTHETIC:  General with block.    INDICATIONS:  This is a 24-year-old gentleman status post calcaneus fracture.  The patient has had previous surgery for this and has gone on to develop significant arthritis of the subtalar joint as well as the above-outlined conditions.  The risks and potential benefits of the procedures were discussed with the patient.  He understands these.  He was given the opportunity to ask questions.  His questions were answered to his satisfaction.  He has given consent to proceed with the above-outlined procedures.    DESCRIPTION OF PROCEDURE:  The patient was brought to the operating room, placed in a supine position on the operating table.  After induction of a general anesthetic, the patient's left leg was prepped and draped free in the usual sterile fashion.    A second surgeon was necessary throughout the procedure due to the patient's increased size and body mass index.  A second surgeon was necessary due to extensive scarring from previous surgery.  This increased the overall technical complexity of the procedure, and a second surgeon was

## (undated) DEVICE — SUTURE PDS + SZ 2 0 L27IN ABSRB VLT L26MM CT 2 1 2 CIR PDP333H

## (undated) DEVICE — BANDAGE COBAN 4 IN COMPR W4INXL5YD FOAM COHESIVE QUIK STK SELF ADH SFT

## (undated) DEVICE — DRAPE ORTH 60X70IN POLY FLM ANCIL U RESIST FLAME TEARING

## (undated) DEVICE — SPLINT QUICK STEP SCOTCHCAST 5 X 30

## (undated) DEVICE — 3M™ IOBAN™ 2 ANTIMICROBIAL INCISE DRAPE 6640EZ: Brand: IOBAN™ 2

## (undated) DEVICE — TOWEL,OR,DSP,ST,BLUE,DLX,8/PK,10PK/CS: Brand: MEDLINE

## (undated) DEVICE — DRESSING,GAUZE,XEROFORM,CURAD,1"X8",ST: Brand: CURAD

## (undated) DEVICE — COVER,TABLE,HEAVY DUTY,77"X90",STRL: Brand: MEDLINE

## (undated) DEVICE — PADDING CAST W4INXL4YD HIGHLY ABSRB THAN COT EZ APPL

## (undated) DEVICE — TOURNIQUET SURGICAL EX LG BLACK WHITE HEMACLEAR

## (undated) DEVICE — SUTURE VICRYL + SZ 3-0 L27IN ABSRB UD L26MM SH 1/2 CIR VCP416H

## (undated) DEVICE — BIT DRL FENESTRATING STRL DISP OMNI

## (undated) DEVICE — GLOVE SURG SZ 65 L12IN FNGR THK79MIL GRN LTX FREE

## (undated) DEVICE — Device

## (undated) DEVICE — GLOVE SURG SZ 85 L1185IN FNGR THK75MIL STRW LTX POLYMER

## (undated) DEVICE — RASP SURG CRV TN SAW CONVX R25 STRYKR STRL

## (undated) DEVICE — VELCLOSE LATEX FREE ELASTIC BANDAGE D/L 6INX10YD

## (undated) DEVICE — TRAP FLUID

## (undated) DEVICE — SUTURE ETHILON SZ 3-0 L18IN NONABSORBABLE BLK L24MM FS-1 3/8 663G

## (undated) DEVICE — BANDAGE,ELASTIC,ESMARK,STERILE,6"X9',LF: Brand: MEDLINE

## (undated) DEVICE — DRAPE EQUIP CARM PK W/PLATE PROTECTOR

## (undated) DEVICE — COUNTERSINK SURG SM

## (undated) DEVICE — SYRINGE MED 10ML LUERLOCK TIP W/O SFTY DISP

## (undated) DEVICE — CONTAINER SPEC 165OZ POLYPR PATH SNAP LOK CAP W/ LID

## (undated) DEVICE — ROLL COT W11.5INXL11FT 1LB HIGHLY ABSRB SURG GRD

## (undated) DEVICE — 3M™ STERI-DRAPE™ U-DRAPE 1015: Brand: STERI-DRAPE™

## (undated) DEVICE — PRECISION OFFSET (5.5 X 0.51 X 18.0MM)

## (undated) DEVICE — GUIDEWIRE ARTHSCP L 3.2MM DISP FOR 4.5/8.5MM BEAMING SYS

## (undated) DEVICE — COVER LT HNDL BLU PLAS

## (undated) DEVICE — SOLUTION IV 1000ML 0.9% SOD CHL

## (undated) DEVICE — GLOVE SURG SZ 65 THK91MIL LTX FREE SYN POLYISOPRENE

## (undated) DEVICE — COUNTER NDL 40 COUNT HLD 70 NUM FOAM BLK SGL MAG W BLDE REMV

## (undated) DEVICE — PODIATRY: Brand: MEDLINE INDUSTRIES, INC.

## (undated) DEVICE — RASP SURG CRV TN SAW CONCV R25 STRYKR STRL

## (undated) DEVICE — PRECISION (9.0 X 0.51 X 25.0MM)